# Patient Record
Sex: FEMALE | Race: WHITE | Employment: FULL TIME | URBAN - METROPOLITAN AREA
[De-identification: names, ages, dates, MRNs, and addresses within clinical notes are randomized per-mention and may not be internally consistent; named-entity substitution may affect disease eponyms.]

---

## 2018-02-09 PROBLEM — I49.3 PVC (PREMATURE VENTRICULAR CONTRACTION): Status: ACTIVE | Noted: 2018-02-09

## 2018-10-24 PROCEDURE — 88175 CYTOPATH C/V AUTO FLUID REDO: CPT | Performed by: OBSTETRICS & GYNECOLOGY

## 2019-03-18 PROBLEM — R10.11 ABDOMINAL PAIN, ACUTE, RIGHT UPPER QUADRANT: Status: ACTIVE | Noted: 2019-03-18

## 2019-03-19 ENCOUNTER — OFFICE VISIT (OUTPATIENT)
Dept: FAMILY MEDICINE CLINIC | Facility: CLINIC | Age: 39
End: 2019-03-19
Payer: COMMERCIAL

## 2019-03-19 VITALS
SYSTOLIC BLOOD PRESSURE: 120 MMHG | DIASTOLIC BLOOD PRESSURE: 82 MMHG | OXYGEN SATURATION: 98 % | HEIGHT: 63.5 IN | HEART RATE: 64 BPM | RESPIRATION RATE: 15 BRPM | TEMPERATURE: 98 F | BODY MASS INDEX: 22.57 KG/M2 | WEIGHT: 129 LBS

## 2019-03-19 DIAGNOSIS — Z80.3 FAMILY HISTORY OF BREAST CANCER: ICD-10-CM

## 2019-03-19 DIAGNOSIS — Z87.19 HISTORY OF MELENA: ICD-10-CM

## 2019-03-19 DIAGNOSIS — Z85.41 HISTORY OF CERVICAL CANCER: ICD-10-CM

## 2019-03-19 DIAGNOSIS — Z13.21 SCREENING FOR ENDOCRINE, NUTRITIONAL, METABOLIC AND IMMUNITY DISORDER: ICD-10-CM

## 2019-03-19 DIAGNOSIS — R82.90 FOUL SMELLING URINE: ICD-10-CM

## 2019-03-19 DIAGNOSIS — N30.00 ACUTE CYSTITIS WITHOUT HEMATURIA: ICD-10-CM

## 2019-03-19 DIAGNOSIS — Z13.228 SCREENING FOR ENDOCRINE, NUTRITIONAL, METABOLIC AND IMMUNITY DISORDER: ICD-10-CM

## 2019-03-19 DIAGNOSIS — Z13.0 SCREENING FOR ENDOCRINE, NUTRITIONAL, METABOLIC AND IMMUNITY DISORDER: ICD-10-CM

## 2019-03-19 DIAGNOSIS — Z12.39 SCREENING FOR BREAST CANCER: ICD-10-CM

## 2019-03-19 DIAGNOSIS — Z13.29 SCREENING FOR ENDOCRINE, NUTRITIONAL, METABOLIC AND IMMUNITY DISORDER: ICD-10-CM

## 2019-03-19 DIAGNOSIS — Z00.00 ENCOUNTER FOR ANNUAL PHYSICAL EXAMINATION EXCLUDING GYNECOLOGICAL EXAMINATION IN A PATIENT OLDER THAN 17 YEARS: Primary | ICD-10-CM

## 2019-03-19 DIAGNOSIS — R10.11 RIGHT UPPER QUADRANT PAIN: ICD-10-CM

## 2019-03-19 LAB
BILIRUBIN: NEGATIVE
GLUCOSE (URINE DIPSTICK): NEGATIVE MG/DL
KETONES (URINE DIPSTICK): NEGATIVE MG/DL
MULTISTIX LOT#: ABNORMAL NUMERIC
NITRITE, URINE: POSITIVE
PH, URINE: 6 (ref 4.5–8)
PROTEIN (URINE DIPSTICK): NEGATIVE MG/DL
SPECIFIC GRAVITY: 1.02 (ref 1–1.03)
URINE-COLOR: YELLOW
UROBILINOGEN,SEMI-QN: 0.2 MG/DL (ref 0–1.9)

## 2019-03-19 PROCEDURE — 99203 OFFICE O/P NEW LOW 30 MIN: CPT | Performed by: EMERGENCY MEDICINE

## 2019-03-19 PROCEDURE — 87186 SC STD MICRODIL/AGAR DIL: CPT | Performed by: EMERGENCY MEDICINE

## 2019-03-19 PROCEDURE — 99386 PREV VISIT NEW AGE 40-64: CPT | Performed by: EMERGENCY MEDICINE

## 2019-03-19 PROCEDURE — 87086 URINE CULTURE/COLONY COUNT: CPT | Performed by: EMERGENCY MEDICINE

## 2019-03-19 PROCEDURE — 81003 URINALYSIS AUTO W/O SCOPE: CPT | Performed by: EMERGENCY MEDICINE

## 2019-03-19 PROCEDURE — 87077 CULTURE AEROBIC IDENTIFY: CPT | Performed by: EMERGENCY MEDICINE

## 2019-03-19 RX ORDER — CIPROFLOXACIN 500 MG/1
500 TABLET, FILM COATED ORAL 2 TIMES DAILY
Qty: 14 TABLET | Refills: 0 | Status: SHIPPED | OUTPATIENT
Start: 2019-03-19 | End: 2019-03-26

## 2019-03-19 NOTE — PATIENT INSTRUCTIONS
Addended by: JACKSON COLÓN on: 4/21/2017 04:00 PM     Modules accepted: Orders     Thank you for choosing [facility]  To Do:  FOR MINESH PADILLA    · Arrange for mammogram  · Arrange for HIDA scan  · Have blood tests done after fasting  · Start OTC Prevacid  · MAy take OTC Maalox for abd pain  · Arrange for genetic counselin want to take calcium supplements. To meet your daily calcium needs, try the foods listed below.   Dairy Fish & beans Other sources      Source   Calcium (mg) per serving   Source   Calcium (mg) per serving   Source   Calcium (mg) per serving      Low-fat yo symptoms at any age who are overweight or obese and have 1 or more additional risk factors for diabetes At least every 3 years   Alcohol misuse All women in this age group At routine exams   Blood pressure All women in this age group Every 2 years if your have no record of this infection or vaccine 2 doses; the second dose should be given at least 4 weeks after the first dose   Hepatitis A Women at increased risk for infection – talk with your healthcare provider 2 doses given 6 months apart   Hepatitis B W Association  2American Cancer Society  3[place]. S. Preventive Services Task Force  [place] [address] [employer]  Date Last Reviewed: 8/27/2015  © 7668-7807 [address], [address] [place] [address]. All rights reserved.  This info

## 2019-03-19 NOTE — PROGRESS NOTES
Diana Byrne is a 37year old female who presents for a complete physical exam.   HPI:     Patient presents with:  Physical: NP, annual physical         Age: 37    1First day of last menstrual period (or first year of         menstruation, if throug wear seat belts? YES       d. If over 27years old, have you  had your cholesterol level checked  in the past five years? NO       e. Have you had a tetanus shot  the past 10 years? YES         f. Does your house have a working smoke detector?  YES Cancer Sister         cervical   • Hypertension Mother    • Heart Attack Maternal Grandfather    • Heart Surgery Maternal Grandfather    • Diabetes Maternal Grandfather    • Heart Attack Paternal Grandmother    • Heart Surgery Paternal Grandmother       So pain. + history of melena  GENITAL/: no dysuria, urgency or frequency + foul smelling urine  MUSCULOSKELETAL: no joint complaints upper or lower extremities  NEURO: no sensory or motor complaint  HEMATOLOGY: denies hx anemia; denies bruising or excessive exams advised. The patient should schedule annual mammogramm. Counseled on fat diet and aerobic exercise 30 minutes three times weekly. Health maintenance.    Immunizations reviewed and updated  The patient indicates understanding of these issues and ag breast cancer  -     OP REFERRAL TO [facility]    History of melena  -     AMYLASE; Future  -     LIPASE; Future  -     OCCULT BLOOD, FECAL, IMMUNOASSAY;  Future  -     SURGERY - INTERNAL        PLAN:     · Arrange for mammogram  · A

## 2019-03-20 ENCOUNTER — APPOINTMENT (OUTPATIENT)
Dept: LAB | Age: 39
End: 2019-03-20
Attending: FAMILY MEDICINE
Payer: COMMERCIAL

## 2019-03-20 DIAGNOSIS — Z87.19 HISTORY OF MELENA: ICD-10-CM

## 2019-03-20 PROCEDURE — 82274 ASSAY TEST FOR BLOOD FECAL: CPT | Performed by: EMERGENCY MEDICINE

## 2019-03-22 ENCOUNTER — TELEPHONE (OUTPATIENT)
Dept: FAMILY MEDICINE CLINIC | Facility: CLINIC | Age: 39
End: 2019-03-22

## 2019-03-22 NOTE — TELEPHONE ENCOUNTER
Patient signed medical records authorization form for the below Facility to disclose health information to [facility]:      Facility / Provider [facility] Phone:   Facility Fax: [phone]    TANIA sent to scanning.  Fax confirmation 3/22/19

## 2019-03-22 NOTE — TELEPHONE ENCOUNTER
Patient signed medical records authorization form for the below Facility to disclose health information to [facility]:      Facility / Provider [facility] Real Phone: [phone]  Facility Fax: [phone]    TANIA sent to scanning.  Fax confirmatio

## 2019-03-23 ENCOUNTER — LAB ENCOUNTER (OUTPATIENT)
Dept: LAB | Age: 39
End: 2019-03-23
Attending: EMERGENCY MEDICINE
Payer: COMMERCIAL

## 2019-03-23 DIAGNOSIS — Z13.228 SCREENING FOR ENDOCRINE, NUTRITIONAL, METABOLIC AND IMMUNITY DISORDER: ICD-10-CM

## 2019-03-23 DIAGNOSIS — R10.11 RIGHT UPPER QUADRANT PAIN: ICD-10-CM

## 2019-03-23 DIAGNOSIS — Z87.19 HISTORY OF MELENA: ICD-10-CM

## 2019-03-23 DIAGNOSIS — Z13.29 SCREENING FOR ENDOCRINE, NUTRITIONAL, METABOLIC AND IMMUNITY DISORDER: ICD-10-CM

## 2019-03-23 DIAGNOSIS — Z13.21 SCREENING FOR ENDOCRINE, NUTRITIONAL, METABOLIC AND IMMUNITY DISORDER: ICD-10-CM

## 2019-03-23 DIAGNOSIS — Z13.0 SCREENING FOR ENDOCRINE, NUTRITIONAL, METABOLIC AND IMMUNITY DISORDER: ICD-10-CM

## 2019-03-23 LAB
ALBUMIN SERPL-MCNC: 3.7 G/DL (ref 3.4–5)
ALBUMIN/GLOB SERPL: 1.2 {RATIO} (ref 1–2)
ALP LIVER SERPL-CCNC: 58 U/L (ref 37–98)
ALT SERPL-CCNC: 19 U/L (ref 13–56)
AMYLASE SERPL-CCNC: 47 U/L (ref 25–115)
ANION GAP SERPL CALC-SCNC: 4 MMOL/L (ref 0–18)
AST SERPL-CCNC: 19 U/L (ref 15–37)
BASOPHILS # BLD AUTO: 0.04 X10(3) UL (ref 0–0.2)
BASOPHILS NFR BLD AUTO: 0.9 %
BILIRUB SERPL-MCNC: 0.6 MG/DL (ref 0.1–2)
BUN BLD-MCNC: 20 MG/DL (ref 7–18)
BUN/CREAT SERPL: 33.3 (ref 10–20)
CALCIUM BLD-MCNC: 8.7 MG/DL (ref 8.5–10.1)
CHLORIDE SERPL-SCNC: 109 MMOL/L (ref 98–107)
CHOLEST SMN-MCNC: 123 MG/DL (ref ?–200)
CO2 SERPL-SCNC: 27 MMOL/L (ref 21–32)
CREAT BLD-MCNC: 0.6 MG/DL (ref 0.55–1.02)
DEPRECATED RDW RBC AUTO: 44.9 FL (ref 35.1–46.3)
EOSINOPHIL # BLD AUTO: 0.09 X10(3) UL (ref 0–0.7)
EOSINOPHIL NFR BLD AUTO: 2.1 %
ERYTHROCYTE [DISTWIDTH] IN BLOOD BY AUTOMATED COUNT: 13.1 % (ref 11–15)
GLOBULIN PLAS-MCNC: 3.1 G/DL (ref 2.8–4.4)
GLUCOSE BLD-MCNC: 75 MG/DL (ref 70–99)
HCT VFR BLD AUTO: 38.6 % (ref 35–48)
HDLC SERPL-MCNC: 67 MG/DL (ref 40–59)
HGB BLD-MCNC: 12.4 G/DL (ref 12–16)
IMM GRANULOCYTES # BLD AUTO: 0.01 X10(3) UL (ref 0–1)
IMM GRANULOCYTES NFR BLD: 0.2 %
LDLC SERPL CALC-MCNC: 51 MG/DL (ref ?–100)
LIPASE SERPL-CCNC: 101 U/L (ref 73–393)
LYMPHOCYTES # BLD AUTO: 1.65 X10(3) UL (ref 1–4)
LYMPHOCYTES NFR BLD AUTO: 38.6 %
M PROTEIN MFR SERPL ELPH: 6.8 G/DL (ref 6.4–8.2)
MCH RBC QN AUTO: 30 PG (ref 26–34)
MCHC RBC AUTO-ENTMCNC: 32.1 G/DL (ref 31–37)
MCV RBC AUTO: 93.2 FL (ref 80–100)
MONOCYTES # BLD AUTO: 0.32 X10(3) UL (ref 0.1–1)
MONOCYTES NFR BLD AUTO: 7.5 %
NEUTROPHILS # BLD AUTO: 2.16 X10 (3) UL (ref 1.5–7.7)
NEUTROPHILS # BLD AUTO: 2.16 X10(3) UL (ref 1.5–7.7)
NEUTROPHILS NFR BLD AUTO: 50.7 %
NONHDLC SERPL-MCNC: 56 MG/DL (ref ?–130)
OSMOLALITY SERPL CALC.SUM OF ELEC: 291 MOSM/KG (ref 275–295)
PLATELET # BLD AUTO: 259 10(3)UL (ref 150–450)
POTASSIUM SERPL-SCNC: 4.2 MMOL/L (ref 3.5–5.1)
RBC # BLD AUTO: 4.14 X10(6)UL (ref 3.8–5.3)
SODIUM SERPL-SCNC: 140 MMOL/L (ref 136–145)
TRIGL SERPL-MCNC: 26 MG/DL (ref 30–149)
TSI SER-ACNC: 0.4 MIU/ML (ref 0.36–3.74)
VLDLC SERPL CALC-MCNC: 5 MG/DL (ref 0–30)
WBC # BLD AUTO: 4.3 X10(3) UL (ref 4–11)

## 2019-03-23 PROCEDURE — 82150 ASSAY OF AMYLASE: CPT | Performed by: EMERGENCY MEDICINE

## 2019-03-23 PROCEDURE — 36415 COLL VENOUS BLD VENIPUNCTURE: CPT | Performed by: EMERGENCY MEDICINE

## 2019-03-23 PROCEDURE — 83690 ASSAY OF LIPASE: CPT | Performed by: EMERGENCY MEDICINE

## 2019-03-23 PROCEDURE — 80050 GENERAL HEALTH PANEL: CPT | Performed by: EMERGENCY MEDICINE

## 2019-03-23 PROCEDURE — 80061 LIPID PANEL: CPT | Performed by: EMERGENCY MEDICINE

## 2019-03-24 ENCOUNTER — MED REC SCAN ONLY (OUTPATIENT)
Dept: FAMILY MEDICINE CLINIC | Facility: CLINIC | Age: 39
End: 2019-03-24

## 2019-03-25 ENCOUNTER — TELEPHONE (OUTPATIENT)
Dept: FAMILY MEDICINE CLINIC | Facility: CLINIC | Age: 39
End: 2019-03-25

## 2019-03-25 NOTE — TELEPHONE ENCOUNTER
----- Message from DAMION Finnegan FNP-C sent at 3/25/2019  8:51 AM CDT -----  Labs are stable- no significant abnormalities. Please make sure she completes stool test given c/o dark stools. Should also schedule HIDA scan.

## 2019-03-25 NOTE — TELEPHONE ENCOUNTER
J LUIS for pt informing her that her results were sent to [facility], instructed pt to call if she has any further questions or concerns with results.

## 2019-05-23 ENCOUNTER — TELEPHONE (OUTPATIENT)
Dept: FAMILY MEDICINE CLINIC | Facility: CLINIC | Age: 39
End: 2019-05-23

## 2019-05-23 NOTE — TELEPHONE ENCOUNTER
Patient was seen on march 19 for physical with UTI. Patient states she has another UTI. Sx cloudy, strong odor and frequency of urination. No pain or burning at this time. Started about a week ago.  Patient was taking a antibiotic and completed the therapy

## 2019-05-23 NOTE — TELEPHONE ENCOUNTER
Spoke with pt. Pt scheduled for OV.    Future Appointments   Date Time Provider Hemant Dorman   5/23/2019  4:00 PM DAMION Roberts, ROBINAP-LEYDI [facility] [place]   10/28/2019 11:00 AM Kelley Rain., Eli Gao, DO [facility]

## 2019-05-23 NOTE — TELEPHONE ENCOUNTER
Patient called and stated she was going to be stuck at work and not able to come in today but wanted to reschedule. Patient is currently at work and when I transferred to phone room to have patient reschedule she hung up.   I did try to contact patient jaki

## 2019-05-28 ENCOUNTER — OFFICE VISIT (OUTPATIENT)
Dept: FAMILY MEDICINE CLINIC | Facility: CLINIC | Age: 39
End: 2019-05-28
Payer: COMMERCIAL

## 2019-05-28 VITALS
HEART RATE: 72 BPM | HEIGHT: 63.5 IN | OXYGEN SATURATION: 98 % | WEIGHT: 133 LBS | BODY MASS INDEX: 23.27 KG/M2 | TEMPERATURE: 98 F | RESPIRATION RATE: 16 BRPM | SYSTOLIC BLOOD PRESSURE: 134 MMHG | DIASTOLIC BLOOD PRESSURE: 100 MMHG

## 2019-05-28 DIAGNOSIS — R35.0 FREQUENCY OF URINATION: Primary | ICD-10-CM

## 2019-05-28 DIAGNOSIS — N30.00 ACUTE CYSTITIS WITHOUT HEMATURIA: ICD-10-CM

## 2019-05-28 DIAGNOSIS — R73.09 ABNORMAL GLUCOSE: ICD-10-CM

## 2019-05-28 PROCEDURE — 87088 URINE BACTERIA CULTURE: CPT | Performed by: NURSE PRACTITIONER

## 2019-05-28 PROCEDURE — 87086 URINE CULTURE/COLONY COUNT: CPT | Performed by: NURSE PRACTITIONER

## 2019-05-28 PROCEDURE — 83036 HEMOGLOBIN GLYCOSYLATED A1C: CPT | Performed by: NURSE PRACTITIONER

## 2019-05-28 PROCEDURE — 81003 URINALYSIS AUTO W/O SCOPE: CPT | Performed by: NURSE PRACTITIONER

## 2019-05-28 PROCEDURE — 99213 OFFICE O/P EST LOW 20 MIN: CPT | Performed by: NURSE PRACTITIONER

## 2019-05-28 PROCEDURE — 87186 SC STD MICRODIL/AGAR DIL: CPT | Performed by: NURSE PRACTITIONER

## 2019-05-28 RX ORDER — CIPROFLOXACIN 500 MG/1
500 TABLET, FILM COATED ORAL 2 TIMES DAILY
Qty: 6 TABLET | Refills: 0 | Status: SHIPPED | OUTPATIENT
Start: 2019-05-28 | End: 2019-06-07

## 2019-05-28 NOTE — PROGRESS NOTES
Anisa Mena is a 37year old female. Patient presents with:  UTI      HPI:   Patient presents with symptoms of UTI. Patient is 37 y female presents with urinary frequency and urine odor. Reports treated for UTI in March.  Complaining of urinary indicates understanding of these issues and agrees to the plan. The patient is asked to return in 3 days if not better. Call if fever, vomiting, worsening symptoms.

## 2019-06-03 ENCOUNTER — TELEPHONE (OUTPATIENT)
Dept: FAMILY MEDICINE CLINIC | Facility: CLINIC | Age: 39
End: 2019-06-03

## 2019-06-03 DIAGNOSIS — N30.00 ACUTE CYSTITIS WITHOUT HEMATURIA: ICD-10-CM

## 2019-06-03 NOTE — TELEPHONE ENCOUNTER
Mk Carvajal, FNP-C  P [facility] Clinical Staff             Patient was prescribed Cipro for 3 days if still having symptoms pls send additional 4 days

## 2019-06-07 RX ORDER — CIPROFLOXACIN 500 MG/1
500 TABLET, FILM COATED ORAL 2 TIMES DAILY
Qty: 8 TABLET | Refills: 0 | Status: SHIPPED | OUTPATIENT
Start: 2019-06-07 | End: 2019-06-11

## 2019-06-07 NOTE — TELEPHONE ENCOUNTER
Spoke to patient. She is still experiencing symptoms-no change. I told her we would send 4 additional days of the antibiotic. Follow up if symptoms persist. Verbalized understanding. Script sent to [facility].

## 2019-07-22 NOTE — TELEPHONE ENCOUNTER
Patient needs the following refill at the [facility] [address] in [place]:  Sertraline HCl (ZOLOFT) 50 MG Oral Tab        Sig - Route: 1 TABLET DAILY - Oral    Class: Historical      Patient has one pill left.

## 2019-07-22 NOTE — TELEPHONE ENCOUNTER
Medication(s) to Refill:   Requested Prescriptions     Pending Prescriptions Disp Refills   • Sertraline HCl (ZOLOFT) 50 MG Oral Tab 30 tablet 0     Sig: Take 1 tablet (50 mg total) by mouth once daily.          Reason for Medication Refill being sent to Pr

## 2019-08-21 NOTE — PROGRESS NOTES
Mandi Nogueira is a 37year old female. Patient presents with:  Medication Follow-Up      HPI:   Patient is here for follow up anxiety and medication refills, reports doing well on medications.  No more panic attacks, mood under control, feels well, n and accepted risks.

## 2019-08-21 NOTE — PATIENT INSTRUCTIONS
Treating Anxiety Disorders with Medicine  An anxiety disorder can make you feel nervous or apprehensive, even without a clear reason.  In people age 72 and older, generalized anxiety disorder is one of the most commonly diagnosed anxiety disorders. Many t Never use alcohol or other drugs with anti-anxiety medicines. This could result in loss of muscular control, sedation, coma, or death. Also, use only the amount of medicine prescribed for you.  If you think you may have taken too much, get emergency care ri · Addiction. If Mikki Brown never had a problem with drugs or alcohol, you may not have a problem with medicines used to treat anxiety disorders. But always discuss the medicines with your healthcare provider before taking them.  If you have a history of addicti

## 2020-02-13 DIAGNOSIS — F41.9 ANXIETY: ICD-10-CM

## 2020-02-13 NOTE — TELEPHONE ENCOUNTER
Medication(s) to Refill:   Requested Prescriptions     Pending Prescriptions Disp Refills   • SERTRALINE HCL 50 MG Oral Tab [Pharmacy Med Name: SERTRALINE 50MG TABLETS] 90 tablet 1     Sig: TAKE 1 TABLET(50 MG) BY MOUTH EVERY DAY         Reason for Medicat

## 2020-06-03 DIAGNOSIS — F41.9 ANXIETY: ICD-10-CM

## 2020-06-08 DIAGNOSIS — F41.9 ANXIETY: ICD-10-CM

## 2020-06-08 NOTE — TELEPHONE ENCOUNTER
Spoke with patient informed her medication was approved with no refills. Patient said she will call us back to schedule an appointment.  Patient will not have insurance for 2 months

## 2020-07-06 DIAGNOSIS — F41.9 ANXIETY: ICD-10-CM

## 2020-07-06 NOTE — TELEPHONE ENCOUNTER
Last office visit:   8/21/19    Appointment scheduled with:      Requested medication: SERTRALINE HCL 50 MG Oral Tab    [id number]    Pt was exposed to Covid and went to [facility], she is waiting for her results.  I offered Pt a video v

## 2020-07-09 DIAGNOSIS — F41.9 ANXIETY: ICD-10-CM

## 2020-08-07 DIAGNOSIS — F41.9 ANXIETY: ICD-10-CM

## 2020-09-13 DIAGNOSIS — F41.9 ANXIETY: ICD-10-CM

## 2020-09-16 DIAGNOSIS — F41.9 ANXIETY: ICD-10-CM

## 2020-09-16 NOTE — TELEPHONE ENCOUNTER
Last office visit:  08/21/2019(if over 1 year, schedule appointment)    Appointment scheduled with:  09/28/2020       Requested medication:   SERTRALINE HCL 50 MG Oral Tab      Pharmacy:    [facility] [address] [facility],  [address], [address]

## 2020-09-16 NOTE — TELEPHONE ENCOUNTER
LF: 8/8/20  LOV: 8/21/19  Patient has an appt scheduled for 9/28/20  Please approve or deny pending Rx. Thank you!

## 2020-09-28 ENCOUNTER — TELEPHONE (OUTPATIENT)
Dept: FAMILY MEDICINE CLINIC | Facility: CLINIC | Age: 40
End: 2020-09-28

## 2020-10-20 DIAGNOSIS — F41.9 ANXIETY: ICD-10-CM

## 2020-11-03 ENCOUNTER — TELEPHONE (OUTPATIENT)
Dept: FAMILY MEDICINE CLINIC | Facility: CLINIC | Age: 40
End: 2020-11-03

## 2020-11-03 DIAGNOSIS — F41.9 ANXIETY: ICD-10-CM

## 2020-11-03 NOTE — TELEPHONE ENCOUNTER
Pt calling in, was just seen by Magalys Mcfadden today (11/03) and her Sertraline HCl 50 MG Oral Tab was not sent in to her pharmacy for a refill.  Pt is needing that to be sent to [address] [place] [address]

## 2020-11-03 NOTE — PROGRESS NOTES
Alfie Morales is a 41year old female. Patient presents with:  Medication Follow-Up      HPI:   Patient follow up for anxiety , feeling  better, few panic attacks, mood under control, feels well, no heart palpitations. No chest pains.   Denies any S TSH W REFLEX TO FREE T4; Future    Encounter for lipid screening for cardiovascular disease  -     LIPID PANEL; Future    Other orders  -     ALPRAZolam (XANAX) 0.25 MG Oral Tab; Take 1 tablet (0.25 mg total) by mouth 2 (two) times daily as needed.       An

## 2020-11-03 NOTE — TELEPHONE ENCOUNTER
Pt states she was supposed to get Sertraline HCl 50 MG Oral Tab [  Refilled from today's visit. Pharmacy doesn't have the rx. Pt needs tonight.

## 2020-12-22 ENCOUNTER — VIRTUAL PHONE E/M (OUTPATIENT)
Dept: FAMILY MEDICINE CLINIC | Facility: CLINIC | Age: 40
End: 2020-12-22
Payer: MEDICAID

## 2020-12-22 DIAGNOSIS — Z20.822 EXPOSURE TO COVID-19 VIRUS: ICD-10-CM

## 2020-12-22 DIAGNOSIS — Z20.822 SUSPECTED COVID-19 VIRUS INFECTION: Primary | ICD-10-CM

## 2020-12-22 PROCEDURE — 99213 OFFICE O/P EST LOW 20 MIN: CPT | Performed by: NURSE PRACTITIONER

## 2020-12-22 RX ORDER — AZITHROMYCIN 250 MG/1
TABLET, FILM COATED ORAL
Qty: 6 TABLET | Refills: 0 | Status: SHIPPED | OUTPATIENT
Start: 2020-12-22 | End: 2020-12-27

## 2020-12-22 NOTE — PATIENT INSTRUCTIONS
Coronavirus Disease 2019 (COVID-19)     [facility] [address] is committed to the safety and well-being of our patients, members, employees, and communities.  As concerns arise about the new strain of coronavirus that causes COVID-19, [facility] [address] ? After 10 days without testing from date of last exposure  ? After day 7 from date of last exposure with a negative test result (test must occur on day 5 or later)  After stopping quarantine, you should  ? Watch for symptoms until 14 days after exposure. 10. Clean all surfaces that are touched often, like counters, tabletops, and doorknobs. Use household cleaning sprays or wipes according to the label instructions.          Seek Further Care     If you are awaiting test results or are confirmed positive for Patients with pending COVID-19 test results should follow all care and home isolation instructions. Your test results will be called to you from an [facility] representative.  If you have not received a call within 2 business days, please call your pr You can also get more information at the following websites:   Centers for Disease Control & Prevention (CDC)  What to do if you are sick with coronavirus disease 2019, [email or web address]. pdf

## 2020-12-22 NOTE — PROGRESS NOTES
Telehealth outside of [address] Verbal Consent   I conducted a telehealth visit with Judie Martin today, 12/22/20, which was completed using two-way, real-time interactive audio and/or video communication.  This has been done in good oxana to a few rapid covid tests which were reportedly negative. Has a pending PCR test that she took on Friday. Alternating Tylenol/Motrin prn. Isolating at home.      Past Medical History:   Diagnosis Date   • Anxiety    • CANCER 2001    cervical   • History of debbie swelling. Gastrointestinal: Negative for nausea, vomiting, abdominal pain and diarrhea. Musculoskeletal: Positive for myalgias. Negative for back pain, joint swelling and joint pain. Skin: Negative for pallor, rash and wound.    Neurological: Negative sufficient and adequate time. This billing was spent on reviewing labs, medications, radiology tests and decision making. Appropriate medical decision-making and tests are ordered as detailed in the plan of care above.

## 2020-12-28 ENCOUNTER — VIRTUAL PHONE E/M (OUTPATIENT)
Dept: FAMILY MEDICINE CLINIC | Facility: CLINIC | Age: 40
End: 2020-12-28
Payer: MEDICAID

## 2020-12-28 DIAGNOSIS — R68.89 FLU-LIKE SYMPTOMS: Primary | ICD-10-CM

## 2020-12-28 PROCEDURE — 99213 OFFICE O/P EST LOW 20 MIN: CPT | Performed by: NURSE PRACTITIONER

## 2020-12-28 NOTE — PROGRESS NOTES
Telehealth outside of [address] Verbal Consent   I conducted a telehealth visit with Shanice Mccoy today, 12/28/20, which was completed using two-way, real-time interactive audio communication.  This has been done in good oxana to provide cont body ache, sore throat, dry cough. She had a negative rapid test and recently found out her covid PCR test was negative as well. She has been isolating at home and treating herself as though she is covid + however.  Started a zpak 4 days ago- has 1 day left sore throat. Respiratory: Negative for cough, chest tightness, shortness of breath and wheezing. Cardiovascular: Negative for chest pain, palpitations and leg swelling. Gastrointestinal: Positive for diarrhea.  Negative for nausea, vomiting and abdo adequate time. This billing was spent on reviewing labs, medications, radiology tests and decision making. Appropriate medical decision-making and tests are ordered as detailed in the plan of care above.

## 2020-12-29 ENCOUNTER — TELEPHONE (OUTPATIENT)
Dept: FAMILY MEDICINE CLINIC | Facility: CLINIC | Age: 40
End: 2020-12-29

## 2020-12-29 NOTE — TELEPHONE ENCOUNTER
She shouldn't return to work if she's still having diarrhea. Should be free of diarrhea for at least 24 hours. Encourage supportive measures: rest, push fluids, BRAT diet as tolerated. IC/ED if any worsening/severe s/s. I know she has had multiple negative covid tests but symptoms are suspicious for covid. IC or ED can also do covid and flu testing (done on a case by case basis pending history and exam).

## 2020-12-29 NOTE — TELEPHONE ENCOUNTER
Spoke to patient. She said she is still having diarrhea but is feeling better from yesterday. She is able to push fluids and eat. I reinforced BRAT diet and pushing fluids. ER precautions reviewed. Knows to be home from work until 24 hours diarrhea free. Encouraged to call back w/ any new concerns.

## 2021-01-04 ENCOUNTER — TELEPHONE (OUTPATIENT)
Dept: FAMILY MEDICINE CLINIC | Facility: CLINIC | Age: 41
End: 2021-01-04

## 2021-01-04 NOTE — TELEPHONE ENCOUNTER
Pt sent mychart message, wanting a return to work note due to being off since 12/16 due to covid symptoms.  Please advise

## 2021-01-04 NOTE — TELEPHONE ENCOUNTER
Pt requesting RTW note. I did call pt to confirm dates. OK to provide [facility] COVID work note once she returns call?

## 2021-01-15 ENCOUNTER — OFFICE VISIT (OUTPATIENT)
Dept: FAMILY MEDICINE CLINIC | Facility: CLINIC | Age: 41
End: 2021-01-15
Payer: MEDICAID

## 2021-01-15 VITALS
HEIGHT: 64 IN | RESPIRATION RATE: 18 BRPM | BODY MASS INDEX: 31.41 KG/M2 | OXYGEN SATURATION: 98 % | WEIGHT: 184 LBS | DIASTOLIC BLOOD PRESSURE: 100 MMHG | TEMPERATURE: 98 F | SYSTOLIC BLOOD PRESSURE: 150 MMHG | HEART RATE: 119 BPM

## 2021-01-15 DIAGNOSIS — R39.9 UTI SYMPTOMS: ICD-10-CM

## 2021-01-15 DIAGNOSIS — N63.25 BREAST LUMP ON LEFT SIDE AT 12 O'CLOCK POSITION: Primary | ICD-10-CM

## 2021-01-15 DIAGNOSIS — Z80.3 FAMILY HISTORY OF BREAST CANCER IN SISTER: ICD-10-CM

## 2021-01-15 DIAGNOSIS — F41.1 GAD (GENERALIZED ANXIETY DISORDER): ICD-10-CM

## 2021-01-15 DIAGNOSIS — R03.0 ELEVATED BLOOD PRESSURE READING IN OFFICE WITHOUT DIAGNOSIS OF HYPERTENSION: ICD-10-CM

## 2021-01-15 LAB
APPEARANCE: CLEAR
MULTISTIX LOT#: 6026 NUMERIC
PH, URINE: 6 (ref 4.5–8)
SPECIFIC GRAVITY: >1.03 (ref 1–1.03)
URINE-COLOR: YELLOW
UROBILINOGEN,SEMI-QN: 0.2 MG/DL (ref 0–1.9)

## 2021-01-15 PROCEDURE — 3077F SYST BP >= 140 MM HG: CPT | Performed by: NURSE PRACTITIONER

## 2021-01-15 PROCEDURE — 3080F DIAST BP >= 90 MM HG: CPT | Performed by: NURSE PRACTITIONER

## 2021-01-15 PROCEDURE — 87086 URINE CULTURE/COLONY COUNT: CPT | Performed by: NURSE PRACTITIONER

## 2021-01-15 PROCEDURE — 87186 SC STD MICRODIL/AGAR DIL: CPT | Performed by: NURSE PRACTITIONER

## 2021-01-15 PROCEDURE — 3008F BODY MASS INDEX DOCD: CPT | Performed by: NURSE PRACTITIONER

## 2021-01-15 PROCEDURE — 81003 URINALYSIS AUTO W/O SCOPE: CPT | Performed by: NURSE PRACTITIONER

## 2021-01-15 PROCEDURE — 99214 OFFICE O/P EST MOD 30 MIN: CPT | Performed by: NURSE PRACTITIONER

## 2021-01-15 PROCEDURE — 87088 URINE BACTERIA CULTURE: CPT | Performed by: NURSE PRACTITIONER

## 2021-01-15 RX ORDER — ALPRAZOLAM 0.25 MG/1
0.25 TABLET ORAL 2 TIMES DAILY PRN
Qty: 60 TABLET | Refills: 0 | Status: SHIPPED | OUTPATIENT
Start: 2021-01-15 | End: 2021-02-14

## 2021-01-15 RX ORDER — SERTRALINE HYDROCHLORIDE 25 MG/1
25 TABLET, FILM COATED ORAL DAILY
Qty: 30 TABLET | Refills: 0 | Status: SHIPPED | OUTPATIENT
Start: 2021-01-15 | End: 2021-03-11

## 2021-01-15 RX ORDER — NEBIVOLOL 5 MG/1
5 TABLET ORAL DAILY
Qty: 7 TABLET | Refills: 0 | COMMUNITY
Start: 2021-01-15 | End: 2021-01-16

## 2021-01-15 RX ORDER — NITROFURANTOIN 25; 75 MG/1; MG/1
100 CAPSULE ORAL 2 TIMES DAILY
Qty: 10 CAPSULE | Refills: 0 | Status: SHIPPED | OUTPATIENT
Start: 2021-01-15 | End: 2021-01-20

## 2021-01-15 RX ORDER — ALPRAZOLAM 0.25 MG/1
0.25 TABLET ORAL 2 TIMES DAILY PRN
Qty: 60 TABLET | Refills: 0 | Status: CANCELLED | OUTPATIENT
Start: 2021-01-15 | End: 2021-02-14

## 2021-01-15 NOTE — PROGRESS NOTES
Chief Complaint:   Patient presents with:  Breast Lump: c/o lump on breast x2 weeks   Urinary Frequency: c/o urinary frequency x2 months w/odor     HPI:   This is a 41year old female presenting for evaluation of new breast lump and UTI symptoms.  Would als • TUBAL LIGATION       Social History:  Social History    Tobacco Use      Smoking status: Never Smoker      Smokeless tobacco: Never Used    Alcohol use: Yes      Comment: Social     Drug use: No    Family History:  Family History   Problem Relation Age o BP (!) 150/100   Pulse 119   Temp 97.9 °F (36.6 °C) (Temporal)   Resp 18   Ht 5' 4\" (1.626 m)   Wt 184 lb (83.5 kg)   LMP  (Approximate)   SpO2 98%   BMI 31.58 kg/m²  Estimated body mass index is 31.58 kg/m² as calculated from the following:    Height as - Nitrofurantoin Monohyd Macro (MACROBID) 100 MG Oral Cap; Take 1 capsule (100 mg total) by mouth 2 (two) times daily for 5 days.     4. JENNIFER (generalized anxiety disorder)  -Increase Zoloft to 75 mg daily.  -Continue alprazolam prn.  -Can remain off work x

## 2021-01-16 RX ORDER — NEBIVOLOL 5 MG/1
5 TABLET ORAL DAILY
Qty: 7 TABLET | Refills: 0 | COMMUNITY
Start: 2021-01-16 | End: 2021-02-25

## 2021-01-16 NOTE — PATIENT INSTRUCTIONS
Osmar Aguirre,     Here is a recap of your office visit:    1. Schedule your mammogram.    2. Schedule consultation with the genetic counselor. 3. Start Macrobid twice daily for UTI symptoms. I'll let you know once the culture results are available. 4.  I · unusual bleeding or bruising  · unusually weak or tired  · vomiting  · yellowing of the eyes or skin  Side effects that usually do not require medical attention (report to your doctor or health care professional if they continue or are bothersome):  · di Visit your doctor or health care professional for regular checks on your progress. Check your heart rate and blood pressure regularly while you are taking this medicine.  Ask your doctor or health care professional what your heart rate and blood pressure sh

## 2021-01-22 ENCOUNTER — OFFICE VISIT (OUTPATIENT)
Dept: FAMILY MEDICINE CLINIC | Facility: CLINIC | Age: 41
End: 2021-01-22
Payer: MEDICAID

## 2021-01-22 ENCOUNTER — LAB ENCOUNTER (OUTPATIENT)
Dept: LAB | Age: 41
End: 2021-01-22
Attending: NURSE PRACTITIONER
Payer: MEDICAID

## 2021-01-22 VITALS
HEIGHT: 64 IN | DIASTOLIC BLOOD PRESSURE: 90 MMHG | WEIGHT: 183 LBS | HEART RATE: 103 BPM | BODY MASS INDEX: 31.24 KG/M2 | TEMPERATURE: 98 F | OXYGEN SATURATION: 99 % | RESPIRATION RATE: 16 BRPM | SYSTOLIC BLOOD PRESSURE: 140 MMHG

## 2021-01-22 DIAGNOSIS — N63.25 BREAST LUMP ON LEFT SIDE AT 12 O'CLOCK POSITION: ICD-10-CM

## 2021-01-22 DIAGNOSIS — Z13.0 SCREENING FOR ENDOCRINE, METABOLIC AND IMMUNITY DISORDER: ICD-10-CM

## 2021-01-22 DIAGNOSIS — Z13.29 SCREENING FOR ENDOCRINE, METABOLIC AND IMMUNITY DISORDER: ICD-10-CM

## 2021-01-22 DIAGNOSIS — F41.9 ANXIETY: ICD-10-CM

## 2021-01-22 DIAGNOSIS — Z13.220 ENCOUNTER FOR LIPID SCREENING FOR CARDIOVASCULAR DISEASE: ICD-10-CM

## 2021-01-22 DIAGNOSIS — Z13.228 SCREENING FOR ENDOCRINE, METABOLIC AND IMMUNITY DISORDER: ICD-10-CM

## 2021-01-22 DIAGNOSIS — Z13.6 ENCOUNTER FOR LIPID SCREENING FOR CARDIOVASCULAR DISEASE: ICD-10-CM

## 2021-01-22 DIAGNOSIS — Z13.21 ENCOUNTER FOR VITAMIN DEFICIENCY SCREENING: ICD-10-CM

## 2021-01-22 DIAGNOSIS — I10 HYPERTENSION, UNSPECIFIED TYPE: Primary | ICD-10-CM

## 2021-01-22 DIAGNOSIS — F41.1 GAD (GENERALIZED ANXIETY DISORDER): ICD-10-CM

## 2021-01-22 LAB
ALBUMIN SERPL-MCNC: 3.6 G/DL (ref 3.4–5)
ALBUMIN/GLOB SERPL: 1 {RATIO} (ref 1–2)
ALP LIVER SERPL-CCNC: 72 U/L
ALT SERPL-CCNC: 18 U/L
ANION GAP SERPL CALC-SCNC: 6 MMOL/L (ref 0–18)
AST SERPL-CCNC: 17 U/L (ref 15–37)
BASOPHILS # BLD AUTO: 0.05 X10(3) UL (ref 0–0.2)
BASOPHILS NFR BLD AUTO: 0.6 %
BILIRUB SERPL-MCNC: 0.5 MG/DL (ref 0.1–2)
BUN BLD-MCNC: 16 MG/DL (ref 7–18)
BUN/CREAT SERPL: 21.9 (ref 10–20)
CALCIUM BLD-MCNC: 9.5 MG/DL (ref 8.5–10.1)
CHLORIDE SERPL-SCNC: 104 MMOL/L (ref 98–112)
CHOLEST SMN-MCNC: 182 MG/DL (ref ?–200)
CO2 SERPL-SCNC: 26 MMOL/L (ref 21–32)
CREAT BLD-MCNC: 0.73 MG/DL
DEPRECATED RDW RBC AUTO: 46.3 FL (ref 35.1–46.3)
EOSINOPHIL # BLD AUTO: 0.13 X10(3) UL (ref 0–0.7)
EOSINOPHIL NFR BLD AUTO: 1.6 %
ERYTHROCYTE [DISTWIDTH] IN BLOOD BY AUTOMATED COUNT: 13.3 % (ref 11–15)
FOLATE SERPL-MCNC: 11.1 NG/ML (ref 8.7–?)
GLOBULIN PLAS-MCNC: 3.6 G/DL (ref 2.8–4.4)
GLUCOSE BLD-MCNC: 88 MG/DL (ref 70–99)
HCT VFR BLD AUTO: 41.6 %
HDLC SERPL-MCNC: 83 MG/DL (ref 40–59)
HGB BLD-MCNC: 13.3 G/DL
IMM GRANULOCYTES # BLD AUTO: 0.04 X10(3) UL (ref 0–1)
IMM GRANULOCYTES NFR BLD: 0.5 %
LDLC SERPL CALC-MCNC: 61 MG/DL (ref ?–100)
LYMPHOCYTES # BLD AUTO: 2.01 X10(3) UL (ref 1–4)
LYMPHOCYTES NFR BLD AUTO: 24.8 %
M PROTEIN MFR SERPL ELPH: 7.2 G/DL (ref 6.4–8.2)
MCH RBC QN AUTO: 30 PG (ref 26–34)
MCHC RBC AUTO-ENTMCNC: 32 G/DL (ref 31–37)
MCV RBC AUTO: 93.9 FL
MONOCYTES # BLD AUTO: 0.62 X10(3) UL (ref 0.1–1)
MONOCYTES NFR BLD AUTO: 7.6 %
NEUTROPHILS # BLD AUTO: 5.27 X10 (3) UL (ref 1.5–7.7)
NEUTROPHILS # BLD AUTO: 5.27 X10(3) UL (ref 1.5–7.7)
NEUTROPHILS NFR BLD AUTO: 64.9 %
NONHDLC SERPL-MCNC: 99 MG/DL (ref ?–130)
OSMOLALITY SERPL CALC.SUM OF ELEC: 283 MOSM/KG (ref 275–295)
PATIENT FASTING Y/N/NP: YES
PATIENT FASTING Y/N/NP: YES
PLATELET # BLD AUTO: 307 10(3)UL (ref 150–450)
POTASSIUM SERPL-SCNC: 3.8 MMOL/L (ref 3.5–5.1)
RBC # BLD AUTO: 4.43 X10(6)UL
SODIUM SERPL-SCNC: 136 MMOL/L (ref 136–145)
TRIGL SERPL-MCNC: 192 MG/DL (ref 30–149)
TSI SER-ACNC: 1.36 MIU/ML (ref 0.36–3.74)
VIT B12 SERPL-MCNC: 543 PG/ML (ref 193–986)
VIT D+METAB SERPL-MCNC: 36.3 NG/ML (ref 30–100)
VLDLC SERPL CALC-MCNC: 38 MG/DL (ref 0–30)
WBC # BLD AUTO: 8.1 X10(3) UL (ref 4–11)

## 2021-01-22 PROCEDURE — 36415 COLL VENOUS BLD VENIPUNCTURE: CPT

## 2021-01-22 PROCEDURE — 82306 VITAMIN D 25 HYDROXY: CPT

## 2021-01-22 PROCEDURE — 82746 ASSAY OF FOLIC ACID SERUM: CPT

## 2021-01-22 PROCEDURE — 82607 VITAMIN B-12: CPT

## 2021-01-22 PROCEDURE — 3077F SYST BP >= 140 MM HG: CPT | Performed by: NURSE PRACTITIONER

## 2021-01-22 PROCEDURE — 3008F BODY MASS INDEX DOCD: CPT | Performed by: NURSE PRACTITIONER

## 2021-01-22 PROCEDURE — 80061 LIPID PANEL: CPT

## 2021-01-22 PROCEDURE — 3080F DIAST BP >= 90 MM HG: CPT | Performed by: NURSE PRACTITIONER

## 2021-01-22 PROCEDURE — 84443 ASSAY THYROID STIM HORMONE: CPT

## 2021-01-22 PROCEDURE — 85025 COMPLETE CBC W/AUTO DIFF WBC: CPT

## 2021-01-22 PROCEDURE — 80053 COMPREHEN METABOLIC PANEL: CPT

## 2021-01-22 PROCEDURE — 99214 OFFICE O/P EST MOD 30 MIN: CPT | Performed by: NURSE PRACTITIONER

## 2021-01-22 RX ORDER — NEBIVOLOL 5 MG/1
2.5 TABLET ORAL DAILY
Qty: 30 TABLET | Refills: 0 | Status: SHIPPED | OUTPATIENT
Start: 2021-01-22 | End: 2021-02-04

## 2021-01-22 NOTE — PROGRESS NOTES
Chief Complaint:   Patient presents with: Follow - Up: 1 week f/u     HPI:   This is a 41year old female presenting for f/u of high blood pressure & anxiety. Blood pressure  Was seen 1 week ago and BP noted to be elevated.  She has a history of anxiet Grandmother      Allergies:    Sulfa Antibiotics       HIVES, Tightness in Throat    Comment:Hives and throat swells  Pcn [Penicillins]           Comment:Throat swells  Sulfur                      Comment:Hives, throat swells  Current Meds:  Current Outpat well-developed and well-nourished. HENT:   Head: Normocephalic and atraumatic. Cardiovascular: Normal rate, regular rhythm and intact distal pulses. Pulmonary/Chest: Effort normal and breath sounds normal. She has no wheezes. She has no rales.    Dylan

## 2021-01-22 NOTE — PATIENT INSTRUCTIONS
Jes Landon,     Here's a recap of our visit today:    1. Start taking Bystolic 2.5 mg daily. Continue checking your blood pressure daily. Send me a list of your readings in 1 week. 2. Continue sertraline 75 mg daily.  We'll re-address your anxiety and ret

## 2021-01-25 DIAGNOSIS — E78.00 ELEVATED CHOLESTEROL: Primary | ICD-10-CM

## 2021-01-28 ENCOUNTER — HOSPITAL ENCOUNTER (OUTPATIENT)
Dept: MAMMOGRAPHY | Age: 41
Discharge: HOME | End: 2021-01-28
Attending: NURSE PRACTITIONER
Payer: MEDICAID

## 2021-01-28 ENCOUNTER — HOSPITAL ENCOUNTER (OUTPATIENT)
Dept: ULTRASOUND IMAGING | Age: 41
Discharge: HOME | End: 2021-01-28
Attending: NURSE PRACTITIONER
Payer: MEDICAID

## 2021-01-28 DIAGNOSIS — N63.25 BREAST LUMP ON LEFT SIDE AT 12 O'CLOCK POSITION: ICD-10-CM

## 2021-01-28 PROCEDURE — 77062 BREAST TOMOSYNTHESIS BI: CPT | Performed by: NURSE PRACTITIONER

## 2021-01-28 PROCEDURE — 76642 ULTRASOUND BREAST LIMITED: CPT | Performed by: NURSE PRACTITIONER

## 2021-01-28 PROCEDURE — 77066 DX MAMMO INCL CAD BI: CPT | Performed by: NURSE PRACTITIONER

## 2021-02-01 ENCOUNTER — TELEPHONE (OUTPATIENT)
Dept: FAMILY MEDICINE CLINIC | Facility: CLINIC | Age: 41
End: 2021-02-01

## 2021-02-01 DIAGNOSIS — Z80.3 FAMILY HISTORY OF BREAST CANCER IN SISTER: Primary | ICD-10-CM

## 2021-02-01 DIAGNOSIS — N64.4 BREAST PAIN: ICD-10-CM

## 2021-02-01 NOTE — TELEPHONE ENCOUNTER
Left message to call back. Appears Left diagnostic breast niya was place by Katia Wright on 1/15/21? I placed order for bilateral US of breast in 6 months.

## 2021-02-01 NOTE — TELEPHONE ENCOUNTER
----- Message from DAMION Weiner FNP-C sent at 1/30/2021  7:57 AM CST -----  Patient needs 6 month f/u diagnostic mammogram of left breast and 6 month f/u US of bilateral breasts. Ok to place orders and notify patient.

## 2021-02-02 NOTE — TELEPHONE ENCOUNTER
lmom for pt with orders due in 6 months. Asked pt after reviewing message to call office with any questions.   Did also mention that with her insurance the [address] will need approval if she could call us closer to the 6 month period in order to work on the [employer]

## 2021-02-04 NOTE — TELEPHONE ENCOUNTER
Pt requesting RTW note for 2/5, she has been off since 12/16. Her current BP is 128/ 74. LOV 1/22 & pt was told to call with BP update in 1 week. OK for RTW note?

## 2021-03-02 DIAGNOSIS — F41.1 GAD (GENERALIZED ANXIETY DISORDER): ICD-10-CM

## 2021-03-02 RX ORDER — SERTRALINE HYDROCHLORIDE 25 MG/1
25 TABLET, FILM COATED ORAL DAILY
Qty: 30 TABLET | Refills: 0 | Status: CANCELLED | OUTPATIENT
Start: 2021-03-02

## 2021-03-02 NOTE — TELEPHONE ENCOUNTER
Medication(s) to Refill:   Requested Prescriptions     Pending Prescriptions Disp Refills   • Sertraline HCl 50 MG Oral Tab 30 tablet 0     Sig: Take 1 tablet (50 mg total) by mouth daily. Take with 25 mg tablet to total 75 mg daily.    • ALPRAZolam 0.25 MG

## 2021-03-09 NOTE — TELEPHONE ENCOUNTER
Patient called for status of refill, patient only has enough medication for tomorrow. Please Advise. Thank you.

## 2021-03-10 DIAGNOSIS — F41.1 GAD (GENERALIZED ANXIETY DISORDER): ICD-10-CM

## 2021-03-11 RX ORDER — ALPRAZOLAM 0.25 MG/1
0.25 TABLET ORAL 2 TIMES DAILY PRN
Qty: 30 TABLET | Refills: 1 | OUTPATIENT
Start: 2021-03-11

## 2021-03-11 RX ORDER — SERTRALINE HYDROCHLORIDE 25 MG/1
25 TABLET, FILM COATED ORAL DAILY
Qty: 30 TABLET | Refills: 0 | Status: SHIPPED | OUTPATIENT
Start: 2021-03-11 | End: 2021-04-13 | Stop reason: DRUGHIGH

## 2021-03-11 RX ORDER — ALPRAZOLAM 0.25 MG/1
0.25 TABLET ORAL 2 TIMES DAILY
Qty: 60 TABLET | Refills: 0 | Status: SHIPPED | OUTPATIENT
Start: 2021-03-11 | End: 2021-11-29

## 2021-04-09 DIAGNOSIS — Z23 NEED FOR VACCINATION: ICD-10-CM

## 2021-04-13 NOTE — PROGRESS NOTES
Telehealth outside of [address] Verbal Consent   I conducted a telehealth visit with Salma Rojas today, 04/13/21, which was completed using two-way, real-time interactive audio and video communication.  This has been done in good oxana to pr Anxiety  Taking sertraline 75 mg daily. Uses alprazolam 0.25 mg bid. Tries to take only once daily, but states she usually will have panic attack the following day. States otherwise, her anxiety is well controlled.  Not having panic attacks if persistentl • Propranolol HCl 20 MG Oral Tab Take 1 tablet (20 mg total) by mouth 2 (two) times daily. 60 tablet 0      Counseling given: Not Answered       REVIEW OF SYSTEMS:   Review of Systems   Constitutional: Negative for chills, fatigue and fever.    Respirator Dispense:    3. PVC (premature ventricular contraction)  -As above. Due for physical and pap. Encouraged to schedule. F/u in 1 month for re-evaluation of anxiety.      Duration of visit: 20 minutes    Please note that the following visit was completed u

## 2021-07-21 ENCOUNTER — TELEMEDICINE (OUTPATIENT)
Dept: FAMILY MEDICINE CLINIC | Facility: CLINIC | Age: 41
End: 2021-07-21
Payer: MEDICAID

## 2021-07-21 VITALS — SYSTOLIC BLOOD PRESSURE: 122 MMHG | DIASTOLIC BLOOD PRESSURE: 71 MMHG

## 2021-07-21 DIAGNOSIS — I10 HYPERTENSION, UNSPECIFIED TYPE: Primary | ICD-10-CM

## 2021-07-21 DIAGNOSIS — F41.1 GAD (GENERALIZED ANXIETY DISORDER): ICD-10-CM

## 2021-07-21 DIAGNOSIS — F10.10 ETOH ABUSE: ICD-10-CM

## 2021-07-21 DIAGNOSIS — I49.3 PVC (PREMATURE VENTRICULAR CONTRACTION): ICD-10-CM

## 2021-07-21 PROCEDURE — 3078F DIAST BP <80 MM HG: CPT | Performed by: NURSE PRACTITIONER

## 2021-07-21 PROCEDURE — 99214 OFFICE O/P EST MOD 30 MIN: CPT | Performed by: NURSE PRACTITIONER

## 2021-07-21 PROCEDURE — 3074F SYST BP LT 130 MM HG: CPT | Performed by: NURSE PRACTITIONER

## 2021-07-21 RX ORDER — SERTRALINE HYDROCHLORIDE 100 MG/1
100 TABLET, FILM COATED ORAL DAILY
Qty: 90 TABLET | Refills: 0 | Status: SHIPPED | OUTPATIENT
Start: 2021-07-21 | End: 2021-11-29

## 2021-07-21 RX ORDER — ALPRAZOLAM 0.25 MG/1
0.25 TABLET ORAL 2 TIMES DAILY PRN
Qty: 60 TABLET | Refills: 0 | Status: SHIPPED | OUTPATIENT
Start: 2021-07-21 | End: 2021-09-08

## 2021-07-21 NOTE — PROGRESS NOTES
Telehealth outside of [address] Verbal Consent   I conducted a telehealth visit with Aidan Dozier today, 07/21/21, which was completed using two-way, real-time interactive audio and video communication.  This has been done in good oxana to pr drinking, but states she's been sober for 7 days and her pressures have returned to normal.    Anxiety  Was worse over the past 3-4 weeks d/t situational stressors. Has gotten better over the past week.  She is on sertraline 100 mg daily and uses alprazolam total) by mouth 2 (two) times daily as needed for Anxiety. 60 tablet 0   • ALPRAZolam 0.25 MG Oral Tab Take 1 tablet (0.25 mg total) by mouth 2 (two) times a day.  60 tablet 0   • Propranolol HCl 20 MG Oral Tab Take 1 tablet (20 mg total) by mouth 2 (two) t (two) times daily as needed for Anxiety.    - Sertraline HCl 100 MG Oral Tab; Take 1 tablet (100 mg total) by mouth daily. 4. ETOH abuse  -Partial remission.   -Continue AA.   -ED if withdrawal.    Duration of visit: 20 minutes     Please note that the

## 2021-08-31 ENCOUNTER — E-VISIT (OUTPATIENT)
Dept: TELEHEALTH | Age: 41
End: 2021-08-31
Payer: MEDICAID

## 2021-08-31 DIAGNOSIS — Z02.9 ENCOUNTERS FOR ADMINISTRATIVE PURPOSE: Primary | ICD-10-CM

## 2021-08-31 NOTE — PROGRESS NOTES
Janiya Sutton is a 35year old female. HPI:   See answers to questions above. Current Outpatient Medications   Medication Sig Dispense Refill   • metoprolol tartrate 25 MG Oral Tab Take 0.5 tablets (12.5 mg total) by mouth 2 (two) times a day.  9

## 2021-09-08 DIAGNOSIS — F41.1 GAD (GENERALIZED ANXIETY DISORDER): ICD-10-CM

## 2021-09-09 RX ORDER — ALPRAZOLAM 0.25 MG/1
0.25 TABLET ORAL 2 TIMES DAILY PRN
Qty: 60 TABLET | Refills: 0 | Status: SHIPPED | OUTPATIENT
Start: 2021-09-09 | End: 2021-11-29

## 2021-09-09 NOTE — TELEPHONE ENCOUNTER
Medication(s) to Refill:   Requested Prescriptions     Pending Prescriptions Disp Refills   • ALPRAZolam 0.25 MG Oral Tab 60 tablet 0     Sig: Take 1 tablet (0.25 mg total) by mouth 2 (two) times daily as needed for Anxiety.          Reason for Medication R

## 2021-09-28 ENCOUNTER — TELEMEDICINE (OUTPATIENT)
Dept: FAMILY MEDICINE CLINIC | Facility: CLINIC | Age: 41
End: 2021-09-28

## 2021-09-28 DIAGNOSIS — F10.10 ETOH ABUSE: ICD-10-CM

## 2021-09-28 DIAGNOSIS — I10 HYPERTENSION, UNSPECIFIED TYPE: Primary | ICD-10-CM

## 2021-09-28 PROCEDURE — 99214 OFFICE O/P EST MOD 30 MIN: CPT | Performed by: NURSE PRACTITIONER

## 2021-09-28 RX ORDER — CLONIDINE HYDROCHLORIDE 0.1 MG/1
0.1 TABLET ORAL 2 TIMES DAILY
Qty: 60 TABLET | Refills: 3 | Status: SHIPPED | OUTPATIENT
Start: 2021-09-28 | End: 2021-10-28

## 2021-09-28 NOTE — PROGRESS NOTES
Telehealth outside of [address] Verbal Consent   I conducted a telehealth visit with Trina Abad today, 09/28/21, which was completed using two-way, real-time interactive  video communication.  This has been done in good oxana to provide con Reports have been binge drinking , stopped for two days. Trying to come clean and attending [facility] [address] meetings.  Good support at home     Wt Readings from Last 6 Encounters:  01/22/21 : 183 lb (83 kg)  01/15/21 : 184 lb (83.5 kg)  11/03/20 : 179 lb (81.2 kg)  08 HEALTH: feels well otherwise, no weight change  EYE: no abnormal vision. RESPIRATORY: denies shortness of breath  CARDIOVASCULAR: denies chest pain, palpitations or orthopnea, no edema.   GI: denies abdominal pain and denies heartburn  NEURO: denies headac

## 2021-10-27 ENCOUNTER — PATIENT MESSAGE (OUTPATIENT)
Dept: FAMILY MEDICINE CLINIC | Facility: CLINIC | Age: 41
End: 2021-10-27

## 2021-10-27 RX ORDER — METOPROLOL SUCCINATE 50 MG/1
50 TABLET, EXTENDED RELEASE ORAL DAILY
Qty: 90 TABLET | Refills: 0 | Status: SHIPPED | OUTPATIENT
Start: 2021-10-27 | End: 2021-11-29

## 2021-10-27 NOTE — TELEPHONE ENCOUNTER
Patient calling on status of refill explained to patient we did not receive request from pharmacy.    Patient is completely out of medication last blood pressure reading 186/122 and having headache

## 2021-10-27 NOTE — TELEPHONE ENCOUNTER
Spoke with PCP. Will send rx for metoprolol to pharmacy. Changed to ER. Patient to take clonidine BID (not PRN). Needs OV tomorrow (ok to double book for 120pm). Stress stop drinking and ER precautions.

## 2021-10-27 NOTE — TELEPHONE ENCOUNTER
Took three doses of Clonidine today. Last dose of Clonidine 45 minutes ago. . /112. Experiencing SOB and headache. Yesterday had chest pain and numbness/tingling in arm. (Not today.)  Advised the patient to go to ER/IC.  Patient started cr

## 2021-10-27 NOTE — TELEPHONE ENCOUNTER
Patient has been out of metoprolol for 3 days. Took dose of clonidine at noon today. Does not take clonidine BID. States that she was told to only take clonidine PRN. /100. Denies hypertensive symptoms at this time. Please advise on refill.  Lavinia Nixon

## 2021-10-27 NOTE — TELEPHONE ENCOUNTER
Notified the patient of medication refill. Patient verbalized understanding. Stated that she was very appreciative. Notified patient to stop drinking. Patient states that she has not had alcohol in 29 days. Stressed ER precautions.  Patient verbalized u

## 2021-11-24 ENCOUNTER — PATIENT MESSAGE (OUTPATIENT)
Dept: FAMILY MEDICINE CLINIC | Facility: CLINIC | Age: 41
End: 2021-11-24

## 2021-11-24 RX ORDER — ALPRAZOLAM 0.25 MG/1
0.25 TABLET ORAL 2 TIMES DAILY
Qty: 60 TABLET | Refills: 0 | OUTPATIENT
Start: 2021-11-24

## 2021-11-24 RX ORDER — METOPROLOL SUCCINATE 50 MG/1
50 TABLET, EXTENDED RELEASE ORAL DAILY
Qty: 30 TABLET | Refills: 0 | OUTPATIENT
Start: 2021-11-24

## 2021-11-24 NOTE — TELEPHONE ENCOUNTER
Due for annual visit     Metoprolol given 90 day supply 10/27/2021    Alprazolam last refill stated she was due for annual visit.

## 2021-11-24 NOTE — TELEPHONE ENCOUNTER
From: Demarcus Jacobs  To: MARA Butler  Sent: 11/24/2021 11:54 AM CST  Subject: Medication refill urgent!!!!!!!    I just realized you guys will be closed until Monday and only have 3 metoprolol succinate left and I can't go without them.  I did s

## 2021-11-29 ENCOUNTER — TELEMEDICINE (OUTPATIENT)
Dept: FAMILY MEDICINE CLINIC | Facility: CLINIC | Age: 41
End: 2021-11-29

## 2021-11-29 DIAGNOSIS — I10 HYPERTENSION, UNSPECIFIED TYPE: Primary | ICD-10-CM

## 2021-11-29 DIAGNOSIS — F41.1 GAD (GENERALIZED ANXIETY DISORDER): ICD-10-CM

## 2021-11-29 DIAGNOSIS — F10.10 ETOH ABUSE: ICD-10-CM

## 2021-11-29 PROCEDURE — 99214 OFFICE O/P EST MOD 30 MIN: CPT | Performed by: NURSE PRACTITIONER

## 2021-11-29 RX ORDER — CLONIDINE HYDROCHLORIDE 0.2 MG/1
0.2 TABLET ORAL 2 TIMES DAILY
Qty: 60 TABLET | Refills: 2 | Status: SHIPPED | OUTPATIENT
Start: 2021-11-29 | End: 2021-12-29

## 2021-11-29 RX ORDER — METOPROLOL TARTRATE 100 MG/1
100 TABLET ORAL 2 TIMES DAILY
Qty: 60 TABLET | Refills: 2 | Status: SHIPPED | OUTPATIENT
Start: 2021-11-29 | End: 2021-12-29

## 2021-11-29 RX ORDER — SERTRALINE HYDROCHLORIDE 100 MG/1
100 TABLET, FILM COATED ORAL DAILY
Qty: 30 TABLET | Refills: 2 | Status: SHIPPED | OUTPATIENT
Start: 2021-11-29 | End: 2021-12-29

## 2021-11-29 NOTE — PROGRESS NOTES
Telehealth outside of [address] Verbal Consent   I conducted a telehealth visit with Donna Terrazas today, 11/29/21, which was completed using two-way, real-time interactive  video communication.  This has been done in good [place] to provide con shots daily. Reports doing AA meetings online , don't  have sponsor. Will set up with [place] today . Pt denies chest pain, shortness of breath, palpitations, orthopnea, peripheral edema, headaches, blurry vision.      Wt Readings from Last 6 Encounters: Vaping Use: Never used    Alcohol use: Yes      Comment: Social     Drug use: No        REVIEW OF SYSTEMS:   GENERAL HEALTH: feels well otherwise, no weight change  EYE: no abnormal vision.   RESPIRATORY: denies shortness of breath  CARDIOVASCULAR: denies c

## 2021-12-13 ENCOUNTER — TELEPHONE (OUTPATIENT)
Dept: FAMILY MEDICINE CLINIC | Facility: CLINIC | Age: 41
End: 2021-12-13

## 2021-12-13 NOTE — TELEPHONE ENCOUNTER
Notified the patient of the below response by the provider. Patient verbalized understanding. Answered all questions at this time.

## 2021-12-13 NOTE — TELEPHONE ENCOUNTER
Pt was told to inform Sharif Milian when her apt with [facility] is, pt is currently scheduled this weds 12/15 at 2:15pm.     Pt now requesting short script for alprazolam 0.25MG in till her apt, she states she is completely out.     LOV:VV 11/29    Pharmacy:[facility]

## 2021-12-13 NOTE — TELEPHONE ENCOUNTER
Patient states that she has a scheduled appointment with [facility] on 12/15. Requesting refill on Xanax. Please advise. Thank you.

## 2021-12-16 ENCOUNTER — TELEPHONE (OUTPATIENT)
Dept: FAMILY MEDICINE CLINIC | Facility: CLINIC | Age: 41
End: 2021-12-16

## 2021-12-16 NOTE — TELEPHONE ENCOUNTER
Patient calling for Xanax was told by NP to call back for refill once she was scheduled at [facility].      Patient will be starting VV Monday - Thursday   1:30 - 4:30 classes  NP - Alex Bui  # [phone]    Patient is out of rx  Patient is aware 48-72 refill request.

## 2021-12-16 NOTE — TELEPHONE ENCOUNTER
Please see below message and advise. Had VV w/ 11/29. Luis Henriquez last prescribed alprazolam 9/9/21. You prescribed her clonidine 11/29.

## 2021-12-30 ENCOUNTER — TELEMEDICINE (OUTPATIENT)
Dept: FAMILY MEDICINE CLINIC | Facility: CLINIC | Age: 41
End: 2021-12-30

## 2021-12-30 DIAGNOSIS — U07.1 COVID-19 VIRUS INFECTION: Primary | ICD-10-CM

## 2021-12-30 PROCEDURE — 99214 OFFICE O/P EST MOD 30 MIN: CPT | Performed by: FAMILY MEDICINE

## 2021-12-30 RX ORDER — BENZONATATE 100 MG/1
100 CAPSULE ORAL 3 TIMES DAILY PRN
Qty: 30 CAPSULE | Refills: 0 | Status: SHIPPED | OUTPATIENT
Start: 2021-12-30

## 2021-12-30 NOTE — PROGRESS NOTES
Virtual Telephone Check-In    Lanney Mohs verbally consents to a Virtual/Telephone Check-In visit on 12/30/21. Patient has been referred to the [facility] website at [email or web address] to review the yearly Consent to Treat document.     Patient under

## 2022-02-24 PROBLEM — R92.8 ABNORMAL FINDINGS ON DIAGNOSTIC IMAGING OF BREAST: Status: ACTIVE | Noted: 2022-02-24

## 2022-02-24 PROBLEM — F43.10 POSTTRAUMATIC STRESS DISORDER: Status: ACTIVE | Noted: 2022-02-24

## 2022-02-24 PROBLEM — G47.00 INSOMNIA: Status: ACTIVE | Noted: 2022-02-24

## 2022-02-24 PROBLEM — E55.9 VITAMIN D DEFICIENCY: Status: ACTIVE | Noted: 2022-02-24

## 2022-02-24 PROBLEM — IMO0001 NORMAL BODY MASS INDEX (BMI): Status: ACTIVE | Noted: 2022-02-24

## 2022-02-24 PROBLEM — I49.49 PREMATURE BEATS: Status: ACTIVE | Noted: 2022-02-24

## 2022-02-24 PROBLEM — F41.1 ANXIETY STATE: Status: ACTIVE | Noted: 2022-02-24

## 2022-02-24 PROBLEM — R45.89 SUICIDAL RISK: Status: ACTIVE | Noted: 2022-02-24

## 2022-02-24 PROBLEM — J02.9 ACUTE PHARYNGITIS: Status: ACTIVE | Noted: 2022-02-24

## 2022-02-24 PROBLEM — R19.7 DIARRHEA: Status: ACTIVE | Noted: 2022-02-24

## 2022-02-24 PROBLEM — F10.929 ALCOHOL INTOXICATION (HCC): Status: ACTIVE | Noted: 2022-02-24

## 2022-02-24 PROBLEM — I49.9 CARDIAC DYSRHYTHMIA: Status: ACTIVE | Noted: 2022-02-24

## 2022-02-24 PROBLEM — R11.0 NAUSEA: Status: ACTIVE | Noted: 2022-02-24

## 2022-02-28 RX ORDER — ALPRAZOLAM 0.25 MG/1
0.25 TABLET ORAL 2 TIMES DAILY PRN
Qty: 60 TABLET | Refills: 0 | Status: SHIPPED | OUTPATIENT
Start: 2022-02-28

## 2022-02-28 RX ORDER — SERTRALINE HYDROCHLORIDE 100 MG/1
100 TABLET, FILM COATED ORAL DAILY
Qty: 30 TABLET | Refills: 0 | Status: SHIPPED | OUTPATIENT
Start: 2022-02-28 | End: 2022-03-30

## 2022-03-01 ENCOUNTER — TELEPHONE (OUTPATIENT)
Dept: FAMILY MEDICINE CLINIC | Facility: CLINIC | Age: 42
End: 2022-03-01

## 2022-03-01 RX ORDER — METOPROLOL TARTRATE 100 MG/1
100 TABLET ORAL 2 TIMES DAILY
Qty: 60 TABLET | Refills: 0 | Status: SHIPPED | OUTPATIENT
Start: 2022-03-01 | End: 2022-03-31

## 2022-03-01 RX ORDER — METOPROLOL TARTRATE 100 MG/1
100 TABLET ORAL 2 TIMES DAILY
Qty: 60 TABLET | Refills: 0 | OUTPATIENT
Start: 2022-03-01 | End: 2022-03-31

## 2022-03-01 NOTE — TELEPHONE ENCOUNTER
Sertraline and Xanax refilled  Pt needs OV for further refills  Per Kd Colunga last note, Pt was to establish care with [facility] for ETOH abuse.  Pls make sure he is [facility] with [facility]

## 2022-03-01 NOTE — TELEPHONE ENCOUNTER
Requesting refill on metoprolol. Last VV for HTN on 11/29/2021. Has not been seen in the office since 1/22/2021. LF 11/29/2021. Rx refilled for 30 days. Left message for the patient to call the office back.  (Need to schedule OV.)

## 2022-03-01 NOTE — TELEPHONE ENCOUNTER
Refill request: metoprolol tartrate 100 MG Oral Tab    Pharmacy: [facility] [id number] #[id number] - [address], [address]), [phone], [phone]    LOV: telemed 2/24/22

## 2022-03-14 NOTE — TELEPHONE ENCOUNTER
No show for video , attempted calling , no answer -patient needs in person follow up  As well   Referral to Stephy [facility]

## 2022-03-15 NOTE — TELEPHONE ENCOUNTER
Second attempt. Left message requesting a call back. M[facility] referral placed. Notified the patient via [address]

## 2022-03-21 DIAGNOSIS — F41.1 GAD (GENERALIZED ANXIETY DISORDER): ICD-10-CM

## 2022-03-21 DIAGNOSIS — I10 HYPERTENSION, UNSPECIFIED TYPE: ICD-10-CM

## 2022-03-24 RX ORDER — SERTRALINE HYDROCHLORIDE 100 MG/1
100 TABLET, FILM COATED ORAL DAILY
Qty: 30 TABLET | Refills: 0 | OUTPATIENT
Start: 2022-03-24 | End: 2022-04-23

## 2022-03-24 RX ORDER — METOPROLOL TARTRATE 100 MG/1
100 TABLET ORAL 2 TIMES DAILY
Qty: 60 TABLET | Refills: 0 | OUTPATIENT
Start: 2022-03-24 | End: 2022-04-23

## 2022-07-26 ENCOUNTER — TELEPHONE (OUTPATIENT)
Dept: FAMILY MEDICINE CLINIC | Facility: CLINIC | Age: 42
End: 2022-07-26

## 2022-07-26 NOTE — TELEPHONE ENCOUNTER
Received a fax from [facility]. Requesting refill on alprazolam.   Last VV 12/30/2021. Last OV 1/22/2021. Rx request denied. Due for OV. PSR: Please assist patient in scheduling OV. Can discuss refills at that time. Thank you.

## 2023-12-06 ENCOUNTER — OFFICE VISIT (OUTPATIENT)
Facility: LOCATION | Age: 43
End: 2023-12-06
Payer: MEDICAID

## 2023-12-06 DIAGNOSIS — S02.31XA CLOSED FRACTURE OF RIGHT ORBITAL FLOOR, INITIAL ENCOUNTER (HCC): Primary | ICD-10-CM

## 2023-12-06 DIAGNOSIS — S02.2XXA CLOSED FRACTURE OF NASAL BONE, INITIAL ENCOUNTER: ICD-10-CM

## 2023-12-06 PROCEDURE — 99205 OFFICE O/P NEW HI 60 MIN: CPT | Performed by: OTOLARYNGOLOGY

## 2023-12-06 RX ORDER — AZITHROMYCIN 250 MG/1
TABLET, FILM COATED ORAL
Qty: 6 TABLET | Refills: 0 | Status: SHIPPED | OUTPATIENT
Start: 2023-12-06

## 2023-12-06 RX ORDER — FLUTICASONE PROPIONATE 50 MCG
2 SPRAY, SUSPENSION (ML) NASAL DAILY
Qty: 16 G | Refills: 3 | Status: SHIPPED | OUTPATIENT
Start: 2023-12-06
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
## (undated) DIAGNOSIS — I10 HYPERTENSION, UNSPECIFIED TYPE: ICD-10-CM

## (undated) DIAGNOSIS — F41.1 GAD (GENERALIZED ANXIETY DISORDER): ICD-10-CM

## (undated) NOTE — LETTER
Date: 1/4/2021    Patient Name: Demarcus Jacobs          To Whom it may concern: The patient was evaluated by [facility] and should be excused from work from *** through *** due to infection or presumed infection of covid 23.    A

## (undated) NOTE — LETTER
06/27/19        [address] [phone]      Dear Cory Garcia,    [facility] records indicate that you have outstanding lab work and or testing that was ordered for you and has not yet been completed:  Orders Placed This Encounter

## (undated) NOTE — LETTER
Date: 2/4/2021    Patient Name: Aidan Dozier          To Whom it may concern: The above patient was seen at the [facility] for treatment of a medical condition.     This patient should be excused from attending work from 12/16/2020